# Patient Record
Sex: MALE | Race: BLACK OR AFRICAN AMERICAN
[De-identification: names, ages, dates, MRNs, and addresses within clinical notes are randomized per-mention and may not be internally consistent; named-entity substitution may affect disease eponyms.]

---
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
## 2022-06-13 NOTE — ASU PATIENT PROFILE, PEDIATRIC - TOBACCO USE
Pt is ambulatory to triage-- reports diffuse lower abdominal pain vomting,diarrhea since Nov 2021. Pt also reports 3 episodes of dark red blood in stool, x 3 days. Denies any blood thinners. Never smoker

## 2022-06-28 NOTE — ASU PATIENT PROFILE, PEDIATRIC - PATIENT KNOW
Show Aperture Variable?: Yes Render Post-Care Instructions In Note?: no Detail Level: Detailed Consent: The patient's consent was obtained including but not limited to risks of crusting, scabbing, blistering, scarring, darker or lighter pigmentary change, recurrence, incomplete removal and infection. Duration Of Freeze Thaw-Cycle (Seconds): 10 Post-Care Instructions: I reviewed with the patient in detail post-care instructions. Patient is to wear sunprotection, and avoid picking at any of the treated lesions. Pt may apply Vaseline to crusted or scabbing areas. Number Of Freeze-Thaw Cycles: 2 freeze-thaw cycles yes Application Tool (Optional): Liquid Nitrogen Sprayer Medical Necessity Information: It is in your best interest to select a reason for this procedure from the list below. All of these items fulfill various CMS LCD requirements except the new and changing color options. Medical Necessity Clause: This procedure was medically necessary because the lesions that were treated were: Spray Paint Text: The liquid nitrogen was applied to the skin utilizing a spray paint frosting technique.

## 2023-01-19 NOTE — PHYSICAL THERAPY INITIAL EVALUATION PEDIATRIC - ASSISTIVE DEVICE:SUPINE/SIT, REHAB EVAL
Chronic patient Established Note (Follow up visit)      SRINIVAS Thrasher is a 70 y.o. male with a history of CAD, CHF with EF 10% with AICD, T2DM, atrial fibrillation on eliquis who presents today with chronic low back pain. This pain started in 1971 as a result of and injury in the .  He describes a constant, right sided pain that starts in his low back and radiates down the posterior aspect of his right leg to the bottom of his foot.  This is associated with a numbness, tingling sensation.  The pain occurs when he is standing still for longer than 5 minutes.  The pain does not occur when walking unless he is standing first.  He denies heaviness and weakness.  This pain is described in detail below.     Aggravating factors: Standing in place for 5 minutes     Mitigating factors: Walking, medication, rest     Previously seeing: Dr. Davide Wang ([place])     Interval History (7/2/2020):  The patient returns to clinic today for follow up of back pain. He continues to report low back pain that radiates down the posterior aspect of his right leg to the bottom of his foot. He denies any left leg pain. His pain is worse with prolonged standing. He has had injections in the past without relief. He is currently taking Tramadol with limited relief. He denies any other health changes. His pain today is 10/10.     Interval History 8/6/2020:   Ishmael Thrasher presents to the clinic for a follow-up appointment for bilateral leg pain. Since the last visit, Ishmael Thrasher states the pain has been worsening. Current pain intensity is 10/10.     Interval History 9/28/2020:  The patient returns to clinic today for follow up of low back and leg pain. He is s/p right L4/5 and L5/S1 TF PÉREZ on 9/11/2020. He reports no significant relief of his back and leg pain. He continues to report low back pain that radiates down the posterior aspect of his right leg to under his foot. He denies any left leg pain. His pain is  worse with prolonged standing and walking. He is currently taking Norco with limited benefit. He reports that this decreases his pain but does not last. He denies any other health changes. His pain today is 10/10.     Interval History 11/16/2020:  The patient returns to clinic today for follow up of low back pain. He is s/p right L5/S1 and S1 TF PÉREZ on 10/23/2020. He reports 60% relief for about a week. He continues to report low back pain that radiates into the posterior aspect of his right leg to his foot. He denies any left leg pain. His pain is worse with standing and walking. He also reports difficulty sleeping due to pain. He is frustrated with his continued pain. He continues to take Norco but reports that this does not last. He denies any other health changes. His pain today is 10/10.     Interval History 12/21/2020:  Ishmael Thrasher presents to the clinic for a  1 month follow-up appointment. Since the last visit, Ishmael Thrasher states the pain has been stable. Current pain intensity is 10/10.    Interval History 3/31/2021  Patient complains of bilateral knee pain, usually uses walker for assistance but was unable to bring with them. Patient is s/p right cooled RFA on 2/26/21 with 60% relief of symptoms that has since returned. He reports he was admitted in the hospital for acute decompensated heart failure following receiving the second dose of the COVID-19 Moderna vaccine. He states he was admitted to [facility] twice and then discharged and re-admitted [facility] (discharged on 3/20/21) the pain started on 5 days ago while there and he reported it while admitted. He reports his pain level is 10/10. Its effecting his ability ambulate as he has not been able to use his legs since being in the hospital. He reports the pain has been this level the last 5 days since worsening. He is taking the Hydrocodone 5/325 TID, which is not helping. Also taking 325mg ASA with moderate relief  "- his heart doctor told him not to continue taking ASA at this dose. During hospital admission, patient complained of left elbow and left knee pain that was attributed to the gouty arthritis that has since resolved. Left knee pain now feels like his "usual" knee pain. Patient states he was able to move around on a daily basis with PT while admitted in the hospital. Feel more depressed and less motivated since receiving 2/16/21. Patient also complains of bilateral back pain radiating down his right leg, which is unchanged from his baseline chronic pain.    Interval History 7/27/2021:  The patient returns to clinic today for follow up of low back and knee pain. He continues to report right knee pain. He also reports increased low back and right hip pain. He reports intermittent radiating pain into his right lateral thigh. His pain is worse with standing and walking. He denies any left leg pain. He continues to perform a home exercise routine. He continues to take Norco as needed. He denies any other health changes. His pain today is 10/10.    Interval History 10/27/2021:  The patient returns to clinic today for follow up of low back and knee pain. He is s/p right SI joint injection on 8/13/2021. He reports 40% relief of his right hip and buttock pain. He continues to reports low back pain that radiates into the posterior aspect of his right leg to his calf. He denies any left leg pain. He reports increased right knee pain. He did have a recent fall which triggered this pain. His pain is worse with standing and walking. He continues to take Norco, although this provides limited relief. He would like to go back to Tramadol. He denies any other health changes. His pain today is 10/10.    Interval History 12/2/2021:  Mr Thrasher presents for routine medication follow up. He stats prior R knee genicular RFA did well. He continues to have diffuse pain complaints one which is R lateral hip pain and inability to lay on that " site due to focal pain. He states tramadol has not benefit when compared to Norco and would like to rotate back to Norco 5/325mg BID. Denies new ban of pain or neurological changes.     Interval History 1/10/2022:  Mr Thrasher presents for follow up of lower back pain and radicular pain down posterior leg going to bottom of calf. He is s/p focal R GTB TTP and states approx 2 weeks benefit. He states rotation from Tramadol to Norco 5/325mg with some mild benefit.     Interval History 6/17/2022:  Mr Thrasher presents for delayed FU. Over interval he has been stable. He inadverntely was Rxed Norco 7.5mg q6hrs. He also has been started on medical mariajuana and received Norco 10mg for gout while visiting ER. We discussed all these issues and Pt voiced understanding. States last Dosing of Rock was this a.m. Denies new areas of pain, denies any neurological changes. Denies SE of medications.     Interval History 9/19/2022:  Mr Thrasher presents for follow up of chronic pain. He has had worsening pain due to being out of medication as FU was just past 90 days. He is having newer return of L elbow pain. Denies further neurological changes. Denies worsening of chronic pain to R hip and R knee. Continues to take Norco 5/325mg BID without SE.         Interval History  12/12/2022  Patient continues to have bilateral knee pain R>L. He did get relief from genicular RFA in past for 4 months. Back pain is constant and 6-7/10. Back pain is right sided, sharp in nature and radiates down his leg. Discussed repeat genicular RFA then possible MBB. Patient agreeable to this plan. Will get L spine CT as patient is unable to get MRI 2/2 AICD.    Interval History 1/19/2023:  Mr Thrasher presents for follow up of chronic knee pain and s/p R genicular RFA with significant improvement of symptoms. He is ready to to address left knee pain. He has had genicular block and found to be diagnostic but has always pursued R knee RFA as this was main pain  generator. He continues to take Norco 5/325mg BID with benefit and denies SE of medications. Denies newer areas of pain or neurological changes.       Pain Disability Index Review:  Last 3 PDI Scores 1/19/2023 12/12/2022 9/19/2022   Pain Disability Index (PDI) 40 34 50       Pain Medications:  Norco 5mg  ASA 325mg  Topical lidocaine      Opioid Contract: yes     report:  Reviewed and consistent with medication use as prescribed.    Pain Procedures:   9/11/2020- Right L4/5 and L5/S1 TF PÉREZ  10/23/2020- Right L5/S1 and S1 TF PÉREZ  2/5/2021- Bilateral genicular nerve block  2/26/2021- Right genicular cooled RFA  11/12/2021 Right knee genicular RFA  12/17/2021: Right Greater Trochanteric Bursa Injection under Fluoroscopic Guidance  1/5/2022 Right knee genicular RFA     Physical Therapy/Home Exercise: Set up from PT since discharge from hospital, but has not arranged.    Imaging:   Narrative & Impression       EXAMINATION:  XR LUMBAR SPINE 5 VIEW WITH FLEX AND EXT     CLINICAL HISTORY:  Back pain or radiculopathy, > 6 wks;  Radiculopathy, lumbar region     TECHNIQUE:  AP, lateral, oblique views of the lumbar spine with spot view of the lumbosacral region and lateral views in flexion and extension.     COMPARISON:  None     FINDINGS:  There is no collapse or malalignment in the lumbar spine.  There is no significant change in alignment between flexion and extension.     There is reduced of the intervertebral disc spaces at L4-L5 and L5-S1 with disc vacuum phenomena and anterior osteophyte formation.  There are mild neural foraminal stenosis at these levels.     There is mild bilateral neural foraminal stenosis.     There are extensive vascular calcifications in the abdominal aorta.  The soft tissues are otherwise unremarkable.     Impression:     1. Moderate to severe spondylosis and neural foraminal stenosis at L4-L5 and L5-S1.  2. Straightening of lumbar lordosis  3. No changes in alignment in flexion and extension      Electronically signed by resident: Marko Lamas  Date:                                            07/07/2020  Time:                                           15:05     Electronically signed by: Roly Qiu  Date:                                            07/07/2020  Time:                                           15:21          Narrative & Impression       EXAMINATION:  CT LUMBAR SPINE WITHOUT CONTRAST     CLINICAL HISTORY:  Back pain or radiculopathy, > 6 wks;.     COMPARISON:  None.     FINDINGS:  Decrease height of vertebral bodies L4-L5 predominant at L5.  Degenerative changes in the endplates with decrease height intervertebral space L4-L5.  Vacuum phenomenon at L5-S1 indicating remarkable decrease.  Facet hypertrophy and degenerative changes in facet joints L3-L4, bilaterally.     Prominent aortic calcifications.     Left pleural effusion and atelectasis     Impression:     Prominent degenerative changes in the lumbar spine at L4, L5 and S1     Decrease height of intervertebral space L4-L5 with degenerative process and decrease height of vertebral bodies L4-L5 predominant at L5     Remarkable disc disease at L5-S1     Atherosclerosis     Left pleural effusion and atelectasis.  Please see CT of the chest        Electronically signed by: Roly Qiu  Date:                                            07/08/2020  Time:                                           16:00          Allergies:   Review of patient's allergies indicates:   Allergen Reactions    Enalapril maleate Swelling and Edema     Other reaction(s): Swelling    Vasotec [enalaprilat] Swelling     Neck swelling    Zoloft [sertraline] Other (See Comments)     Patient states it put him to sleep in the hospital he could not talk nor move    Cyclobenzaprine Hallucinations     Other reaction(s): Swelling  Hallucinations according to patient.    Other reaction(s): Lymphadenopathy, Restlessness, Sensation of being cold, Restlessness,  Sensation of being cold    Amitriptyline Hallucinations    Labetalol      Other reaction(s): Pharyngeal swelling    Lisinopril      Other reaction(s): Pharyngeal swelling    Tramadol Nausea Only and Hallucinations       Current Medications:   Current Outpatient Medications   Medication Sig Dispense Refill    allopurinol (ZYLOPRIM) 300 MG tablet Take 300 mg by mouth once daily.      apixaban (ELIQUIS) 5 mg Tab Take 5 mg by mouth 2 (two) times daily.      aspirin (ECOTRIN) 81 MG EC tablet Take 81 mg by mouth.      atorvastatin (LIPITOR) 80 MG tablet Take 80 mg by mouth once daily.      cholecalciferol, vitamin D3, 3,000 unit Tab Take 1 tablet by mouth.      coenzyme Q10 10 mg capsule Take 200 mg by mouth once daily.      colchicine (COLCRYS) 0.6 mg tablet TK 1 T PO BID PRF GOUT      febuxostat (ULORIC) 40 mg Tab Take 1 tablet (40 mg total) by mouth once daily. 30 tablet 6    glipiZIDE (GLUCOTROL) 5 MG tablet Take 5 mg by mouth 2 (two) times daily before meals.      HYDROcodone-acetaminophen (NORCO) 5-325 mg per tablet Take 1 tablet by mouth every 6 (six) hours as needed for Pain. 60 tablet 0    indomethacin (INDOCIN) 50 MG capsule Take 1 capsule (50 mg total) by mouth 3 (three) times daily with meals. 30 capsule 0    melatonin 5 mg Cap Take by mouth.      metoprolol succinate (TOPROL-XL) 50 MG 24 hr tablet Take 1 tablet (50 mg total) by mouth once daily. 30 tablet 1    potassium chloride SA (K-DUR,KLOR-CON) 20 MEQ tablet Take 1 tablet (20 mEq total) by mouth 2 (two) times daily. 180 tablet 3    risperiDONE (RISPERDAL) 3 MG Tab Take by mouth.      sacubitriL-valsartan (ENTRESTO) 49-51 mg per tablet Take 1 tablet by mouth 2 (two) times daily. 60 tablet 3    sertraline (ZOLOFT) 50 MG tablet Take 50 mg by mouth once daily. TAKE THREE TABLETS BY MOUTH EVERY MORNING TO IMPROVE MOOD      sildenafiL (VIAGRA) 100 MG tablet Take 1 tablet (100 mg total) by mouth As instructed for Erectile Dysfunction. Please take 1 tablet one  hour prior to intercourse on empty stomach 30 tablet 1    spironolactone (ALDACTONE) 25 MG tablet Take 1 tablet (25 mg total) by mouth once daily. 90 tablet 3    tadalafiL (CIALIS) 20 MG Tab Take 1 pill as needed prior to sex 20 tablet 1    torsemide (DEMADEX) 20 MG Tab Take 2 tablets (40 mg total) by mouth 2 (two) times daily. 360 tablet 3     No current facility-administered medications for this visit.       REVIEW OF SYSTEMS:    GENERAL:  No weight loss, malaise or fevers.  HEENT:  Negative for frequent or significant headaches.  NECK:  Negative for lumps, goiter, pain and significant neck swelling.  RESPIRATORY:  Negative for cough, wheezing or shortness of breath.  CARDIOVASCULAR:  Negative for chest pain, leg swelling or palpitations. +CHF, CAD, HTN  GI:  Negative for abdominal discomfort, blood in stools or black stools or change in bowel habits. GERD  MUSCULOSKELETAL:  See HPI  SKIN:  Negative for lesions, rash, and itching.  PSYCH:  Negative for sleep disturbance, mood disorder and recent psychosocial stressors.  HEMATOLOGY/LYMPHOLOGY:  Negative for prolonged bleeding, bruising easily or swollen nodes.  NEURO:   Daily migraine headaches  ENDO: Diabetes  All other reviewed and negative other than HPI.    Past Medical History:  Past Medical History:   Diagnosis Date    Brain damage     traumatic    CAD (coronary artery disease)     Cardiomyopathy     CHF (congestive heart failure)     Diabetes mellitus     type 2    Edema     Erectile dysfunction     GERD (gastroesophageal reflux disease)     HTN (hypertension)     Hyperlipemia     Sciatic nerve pain, right     leg       Past Surgical History:  Past Surgical History:   Procedure Laterality Date    CARDIAC DEFIBRILLATOR PLACEMENT      CATHETERIZATION OF BOTH LEFT AND RIGHT HEART Bilateral 08/20/2018    CORONARY ARTERY BYPASS GRAFT      ILIAC ARTERY STENT      INJECTION OF JOINT Right 8/13/2021    Procedure: INJECTION, JOINT, SACROILIAC (SI);  Surgeon: Austin  MD Jaqueline;  Location: [facility] PAIN MGT;  Service: Pain Management;  Laterality: Right;    RADIOFREQUENCY ABLATION Right 2/26/2021    Procedure: RADIOFREQUENCY ABLATION GENICULAR NERVES, COOLED;  Surgeon: Austin Parsons MD;  Location: [facility] PAIN MGT;  Service: Pain Management;  Laterality: Right;  1 of 2  consent needed  eliquis clearance requested    RADIOFREQUENCY ABLATION Right 11/12/2021    Procedure: RADIOFREQUENCY ABLATION, GENICULAR COOLED  NEED CONSENT/ clear to hold  ELIQUIS;  Surgeon: Austin Parsons MD;  Location: [facility] PAIN MGT;  Service: Pain Management;  Laterality: Right;    RADIOFREQUENCY ABLATION Right 1/5/2023    Procedure: RADIOFREQUENCY ABLATION RIGHT GENICULAR NERVES COOLED clear to hold Eliquis 3 days;  Surgeon: Austin Parsons MD;  Location: [facility] PAIN MGT;  Service: Pain Management;  Laterality: Right;    RIGHT HEART CATHETERIZATION Right 8/17/2020    Procedure: INSERTION, CATHETER, RIGHT HEART;  Surgeon: Brianda Navas MD;  Location: [facility] CATH LAB;  Service: Cardiology;  Laterality: Right;    TRANSFORAMINAL EPIDURAL INJECTION OF STEROID Right 9/11/2020    Procedure: INJECTION, STEROID, EPIDURAL, TRANSFORAMINAL APPROACH, L4-L5 AND L5-S1 clear to hold Eliquis 3 days;  Surgeon: Austin Parsons MD;  Location: [facility] PAIN MGT;  Service: Pain Management;  Laterality: Right;    TRANSFORAMINAL EPIDURAL INJECTION OF STEROID Right 10/23/2020    Procedure: INJECTION, STEROID, EPIDURAL, TRANSFORAMINAL APPROACH;  Surgeon: Austin Parsons MD;  Location: [facility] PAIN MGT;  Service: Pain Management;  Laterality: Right;  RT RFA L5/S1 and S1  Eliquis clearance in Media       Family History:  History reviewed. No pertinent family history.    Social History:  Social History     Socioeconomic History    Marital status:    Tobacco Use    Smoking status: Never    Smokeless tobacco: Never   Substance and Sexual Activity    Alcohol use: Not Currently    Drug use: Not Currently       OBJECTIVE:    /84 (BP  "Location: Left arm, Patient Position: Sitting, BP Method: Large (Automatic))   Pulse (!) 51   Resp 18   Ht 6' 4" (1.93 m)   Wt 108.9 kg (240 lb)   BMI 29.21 kg/m²     PHYSICAL EXAMINATION: 2021  Voice normal.  Normal affect without evidence of distress.  Musculoskeletal: Focal TTP to R GTB, + R leg straight leg test, + facet loading R L-spine  Limited ROM of the knee joints R>L.   Gait: antalgic, aided with cane     ASSESSMENT: 73 y.o. year old male with pain, consistent with the followin. Chronic pain of left knee  Procedure Order to Pain Management      2. Dorsalgia, unspecified        3. Primary osteoarthritis of knee, unspecified laterality        4. Lumbar spondylosis              PLAN:   - Prior records reviewed  - S/p Repeat R knee genicular RFA and states significant improvement in pain and functioning   - will s/f left genicular RFA  -UDS 2022 Compliant   - LAPMP without issues  - Dr Parsons is provider of medication mgt and agrees with above plan of care.   -Repeat L spine CT was not done, cannot do MRI 2/2 AICD   -Possible MBB to RFA if appropriate but will address L knee 1st.   -Follow up 4 weeks after RFA.   The above plan and management options were discussed at length with patient. Patient is in agreement with the above and verbalized understanding.    Mike Daley NP  2023     I spent a total of 30 minutes on the day of the visit.  This includes face to face time and non-face to face time preparing to see the patient by reviewing previous labs/imaging, obtaining and/or reviewing separately obtained history, documenting clinical information in the electronic or other health record, independently interpreting results and communicating results to the patient/family/caregiver.        " HOB elevated

## 2024-01-14 ENCOUNTER — EMERGENCY (EMERGENCY)
Facility: HOSPITAL | Age: 24
LOS: 0 days | Discharge: HOME | End: 2024-01-14
Attending: EMERGENCY MEDICINE
Payer: COMMERCIAL

## 2024-01-14 VITALS
WEIGHT: 255.07 LBS | SYSTOLIC BLOOD PRESSURE: 188 MMHG | HEIGHT: 66 IN | HEART RATE: 101 BPM | OXYGEN SATURATION: 99 % | DIASTOLIC BLOOD PRESSURE: 89 MMHG | TEMPERATURE: 99 F | RESPIRATION RATE: 18 BRPM

## 2024-01-14 DIAGNOSIS — M54.89 OTHER DORSALGIA: ICD-10-CM

## 2024-01-14 DIAGNOSIS — F17.200 NICOTINE DEPENDENCE, UNSPECIFIED, UNCOMPLICATED: ICD-10-CM

## 2024-01-14 DIAGNOSIS — Z98.890 OTHER SPECIFIED POSTPROCEDURAL STATES: Chronic | ICD-10-CM

## 2024-01-14 PROCEDURE — 71046 X-RAY EXAM CHEST 2 VIEWS: CPT | Mod: 26

## 2024-01-14 PROCEDURE — 72100 X-RAY EXAM L-S SPINE 2/3 VWS: CPT | Mod: 26

## 2024-01-14 PROCEDURE — 99284 EMERGENCY DEPT VISIT MOD MDM: CPT | Mod: 25

## 2024-01-14 PROCEDURE — 71046 X-RAY EXAM CHEST 2 VIEWS: CPT

## 2024-01-14 PROCEDURE — 96372 THER/PROPH/DIAG INJ SC/IM: CPT

## 2024-01-14 PROCEDURE — 99284 EMERGENCY DEPT VISIT MOD MDM: CPT

## 2024-01-14 PROCEDURE — 72100 X-RAY EXAM L-S SPINE 2/3 VWS: CPT

## 2024-01-14 RX ORDER — METHOCARBAMOL 500 MG/1
1000 TABLET, FILM COATED ORAL ONCE
Refills: 0 | Status: COMPLETED | OUTPATIENT
Start: 2024-01-14 | End: 2024-01-14

## 2024-01-14 RX ORDER — KETOROLAC TROMETHAMINE 30 MG/ML
30 SYRINGE (ML) INJECTION ONCE
Refills: 0 | Status: DISCONTINUED | OUTPATIENT
Start: 2024-01-14 | End: 2024-01-14

## 2024-01-14 RX ADMIN — METHOCARBAMOL 1000 MILLIGRAM(S): 500 TABLET, FILM COATED ORAL at 22:03

## 2024-01-14 RX ADMIN — Medication 30 MILLIGRAM(S): at 22:02

## 2024-01-14 NOTE — ED ADULT NURSE NOTE - OBJECTIVE STATEMENT
Patient reports to ED c/o mid back pain that started yesterday and worsened today. Patient denies any recent trauma to the area.

## 2024-01-14 NOTE — ED PROVIDER NOTE - CARE PLAN
He is ok with seeing rheum. Please call him to schedule appoint. Thank you   1 Principal Discharge DX:	Back pain

## 2024-01-14 NOTE — ED PROVIDER NOTE - PATIENT PORTAL LINK FT
You can access the FollowMyHealth Patient Portal offered by [facility] by registering at the following website: [email or web address]. By joining [employer]’s FollowMyHealth portal, you will also be able to view your health information using other applications (apps) compatible with our system. You can access the FollowMyHealth Patient Portal offered by [facility] by registering at the following website: [email or web address]. By joining [employer]’s FollowMyHealth portal, you will also be able to view your health information using other applications (apps) compatible with our system. You can access the FollowMyHealth Patient Portal offered by [facility] by registering at the following website: [email or web address]. By joining [employer]’s FollowMyHealth portal, you will also be able to view your health information using other applications (apps) compatible with our system.

## 2024-01-14 NOTE — ED PROVIDER NOTE - OBJECTIVE STATEMENT
23 male history of severe scoliosis status post lumbosacral spinal fusion 2017 presenting to ED for evaluation of right-sided back pain radiating to the front.  Patient states that the symptoms been going on since about 5 today and describes them as a sharp sensation in his back.  Concerned about the hardware.  Denies any fever, chills, N, V, CP, SOB, recent trauma to back

## 2024-01-14 NOTE — ED PROVIDER NOTE - PHYSICAL EXAMINATION
VITAL SIGNS: I have reviewed nursing notes and confirm.  CONSTITUTIONAL:  in no acute distress.  SKIN: Skin exam is warm and dry, no acute rash.  HEAD: Normocephalic; atraumatic.  EYES:  EOM intact; conjunctiva and sclera clear.  ENT: No nasal discharge; airway clear  NECK: Supple; non tender.  CARD: S1, S2 normal; no murmurs, gallops, or rubs. Regular rate and rhythm. pain to right back with certain movements of arm   RESP: No wheezes, rales or rhonchi. Speaking in full sentences.   ABD: Normal bowel sounds; soft; non-distended; non-tender; No rebound or guarding. No CVA tenderness. no midline tenderness

## 2024-01-14 NOTE — ED PROVIDER NOTE - WR INTERPRETATION DATE TIME  1
Post-Discharge Transitional Care Management Services or Hospital Follow Up      Augustine Johnson   YOB: 1967    Date of Office Visit:  9/28/2021  Date of Hospital Admission: 9/26/21  Date of Hospital Discharge: 9/26/21  Readmission Risk Score(high >=14%.  Medium >=10%):No data recorded    Care management risk score Rising risk (score 2-5) and Complex Care (Scores >=6): 4     Non face to face  following discharge, date last encounter closed (first attempt may have been earlier): *No documented post hospital discharge outreach found in the last 14 days *No documented post hospital discharge outreach found in the last 14 days    Call initiated 2 business days of discharge: *No response recorded in the last 14 days     Patient Active Problem List   Diagnosis    Hyperlipidemia    Tobacco abuse    Plantar fasciitis    GERD (gastroesophageal reflux disease)    Coronary artery disease involving native coronary artery of native heart without angina pectoris    S/P right coronary artery (RCA) stent placement    Hypothyroidism    Mixed hyperlipidemia    Acute non-recurrent sinusitis    Essential hypertension    Chronic cholecystitis with calculus    Dizziness    Chronic pansinusitis    Hyponatremia    Acute kidney injury ([address]    Diarrhea    BRBPR (bright red blood per rectum)    Chronic heartburn    Family history of colonic polyps       Allergies   Allergen Reactions    Aspirin Swelling     Throat & Tongue swells per patient     Bactrim [Sulfamethoxazole-Trimethoprim] Other (See Comments)     Severe muscle cramps, dizziness    Ciprofloxacin Swelling    Simvastatin      Makes hand hurt and muscles        Adhesive Tape Rash     CAN USE SILK OR CLOTH TAPE    Lipitor [Atorvastatin] Other (See Comments)     Muscles hurt per patient     Tramadol Nausea Only       Medications listed as ordered at the time of discharge from hospital   [place], [address] Medication Instructions PIERCE: Printed on:09/28/21 1144   Medication Information                      albuterol (ACCUNEB) 1.25 MG/3ML nebulizer solution  Inhale 3 mLs into the lungs every 6 hours as needed for Wheezing             amoxicillin-clavulanate (AUGMENTIN) 875-125 MG per tablet  Take 1 tablet by mouth 2 times daily for 10 days             budesonide (PULMICORT) 0.25 MG/2ML nebulizer suspension  INHALE 2MLS VIA NEBULIZER 2 TIMES DAILY             clopidogrel (PLAVIX) 75 MG tablet  TAKE 1 TABLET BY MOUTH EVERY DAY             famotidine (PEPCID) 40 MG tablet  Take 40 mg by mouth 2 times daily             fluticasone (FLONASE) 50 MCG/ACT nasal spray  SPRAY 2 SPRAYS INTO EACH NOSTRIL EVERY DAY             gabapentin (NEURONTIN) 300 MG capsule  Take 300 mg by mouth 3 times daily. HYDROcodone-acetaminophen (NORCO) 5-325 MG per tablet  Take 1 tablet by mouth every 8 hours as needed. ipratropium-albuterol (DUONEB) 0.5-2.5 (3) MG/3ML SOLN nebulizer solution  Inhale 3 mLs into the lungs every 6 hours as needed for Shortness of Breath             levothyroxine (SYNTHROID) 75 MCG tablet  TAKE 1 TABLET BY MOUTH EVERY DAY             lisinopril (PRINIVIL;ZESTRIL) 20 MG tablet  TAKE 1 TABLET BY MOUTH EVERY DAY             metoclopramide (REGLAN) 10 MG tablet  Take 1 tablet by mouth 4 times daily             metoprolol succinate (TOPROL XL) 100 MG extended release tablet  TAKE 1 TABLET BY MOUTH EVERY DAY             montelukast (SINGULAIR) 10 MG tablet  TAKE 1 TABLET BY MOUTH EVERY DAY             nitroGLYCERIN (NITROSTAT) 0.3 MG SL tablet  up to max of 3 total doses. If no relief after 1 dose, call 911.              PARoxetine (PAXIL) 10 MG tablet  Take 1 tablet by mouth daily             promethazine (PHENERGAN) 25 MG tablet  Take 1 tablet by mouth every 8 hours as needed for Nausea             rosuvastatin (CRESTOR) 20 MG tablet  TAKE 1 TABLET BY MOUTH EVERY DAY             tiZANidine (ZANAFLEX) 4 MG tablet  Take 4 mg by mouth 2 times daily as needed             triamcinolone (KENALOG) 0.1 % cream  Apply topically 2 times daily Apply topically 2 times daily. Medications marked \"taking\" at this time  Outpatient Medications Marked as Taking for the 9/28/21 encounter (Office Visit) with GUERLINE Woods   Medication Sig Dispense Refill    levothyroxine (SYNTHROID) 75 MCG tablet TAKE 1 TABLET BY MOUTH EVERY DAY 90 tablet 1    montelukast (SINGULAIR) 10 MG tablet TAKE 1 TABLET BY MOUTH EVERY DAY 90 tablet 1    lisinopril (PRINIVIL;ZESTRIL) 20 MG tablet TAKE 1 TABLET BY MOUTH EVERY DAY 90 tablet 1    PARoxetine (PAXIL) 10 MG tablet Take 1 tablet by mouth daily 30 tablet 3    amoxicillin-clavulanate (AUGMENTIN) 875-125 MG per tablet Take 1 tablet by mouth 2 times daily for 10 days 20 tablet 0    metoclopramide (REGLAN) 10 MG tablet Take 1 tablet by mouth 4 times daily 120 tablet 0    metoprolol succinate (TOPROL XL) 100 MG extended release tablet TAKE 1 TABLET BY MOUTH EVERY DAY 90 tablet 3    clopidogrel (PLAVIX) 75 MG tablet TAKE 1 TABLET BY MOUTH EVERY DAY 14 tablet 0    fluticasone (FLONASE) 50 MCG/ACT nasal spray SPRAY 2 SPRAYS INTO EACH NOSTRIL EVERY DAY 1 Bottle 0    rosuvastatin (CRESTOR) 20 MG tablet TAKE 1 TABLET BY MOUTH EVERY DAY 90 tablet 0    HYDROcodone-acetaminophen (NORCO) 5-325 MG per tablet Take 1 tablet by mouth every 8 hours as needed.  famotidine (PEPCID) 40 MG tablet Take 40 mg by mouth 2 times daily      promethazine (PHENERGAN) 25 MG tablet Take 1 tablet by mouth every 8 hours as needed for Nausea 60 tablet 0    gabapentin (NEURONTIN) 300 MG capsule Take 300 mg by mouth 3 times daily.       tiZANidine (ZANAFLEX) 4 MG tablet Take 4 mg by mouth 2 times daily as needed      budesonide (PULMICORT) 0.25 MG/2ML nebulizer suspension INHALE 2MLS VIA NEBULIZER 2 TIMES DAILY (Patient taking differently: Take 1 ampule by nebulization 2 times daily as needed Rinse mouth out with water (without swallowing) after every dose.) 360 mL 1    albuterol (ACCUNEB) 1.25 MG/3ML nebulizer solution Inhale 3 mLs into the lungs every 6 hours as needed for Wheezing 360 mL 3    triamcinolone (KENALOG) 0.1 % cream Apply topically 2 times daily Apply topically 2 times daily. 80 g 1    nitroGLYCERIN (NITROSTAT) 0.3 MG SL tablet up to max of 3 total doses. If no relief after 1 dose, call 911. 30 tablet 3    ipratropium-albuterol (DUONEB) 0.5-2.5 (3) MG/3ML SOLN nebulizer solution Inhale 3 mLs into the lungs every 6 hours as needed for Shortness of Breath 360 mL 0        Medications patient taking as of now reconciled against medications ordered at time of hospital discharge: Yes    Chief Complaint   Patient presents with    Follow-Up from Hospital     pt stated that on saturday she started throwing up and had blood in her stool.  Labs Only     needing labs for medication refill    Abdominal Pain     pt states her stomach is hurting all over       HPI    Inpatient course: Discharge summary reviewed- see chart. Chichi Calvillo is a 49 y.o. female who presents to the emergency department with complaints of NVD acute onset started last night. She denies any fever or chills tells me 2 years ago she had a similar episode she actually got septic and had to be admitted for IV antibiotics she endorses obvious blood in her stool she is giving us a stool sample at this time she does not have a gallbladder gallstone or pancreatic history she does have a GERD history. She does not have any concerning vitals on triage today for sepsis criteria. The pain is primarily left upper quadrant with no flank referral.  Described as a 3 out of 10 no interventions. Denies any respiratory symptoms no loss of taste or smell no body aches. Patient denies any  symptoms. Patient has an MDRO history tells me she had C. difficile 2 years ago as well denies any recent antibiotics.   Multiple allergies.     Interval history/Current status: Still hurting in abdomen  Was treated with Reglan, and Augmentin for infection colitis   Doesn't feel bad but stools are not watery smells bad. Like mush. It is yellow in color. Gi dr?   Having diarrhea after eating and eating meals causing abdominal distention and pain. Trouble with the 3 fingers on the right hand numbness and at night goes to sleep. Can't feel heat or cold. Or sharp edges. Review of Systems   Constitutional: Positive for appetite change. Negative for fever. HENT: Negative for nosebleeds and trouble swallowing. Respiratory: Positive for cough and shortness of breath. Cardiovascular: Negative for chest pain and leg swelling. Gastrointestinal: Positive for abdominal distention, abdominal pain, diarrhea, nausea and vomiting. Genitourinary: Negative for difficulty urinating and frequency. Musculoskeletal: Positive for back pain. Negative for myalgias. Neurological: Positive for dizziness. Negative for headaches. Psychiatric/Behavioral: Positive for dysphoric mood. Negative for sleep disturbance. Vitals:    09/28/21 1029   BP: 120/70   Site: Left Upper Arm   Position: Sitting   Cuff Size: Medium Adult   Pulse: 64   Temp: 98.1 °F (36.7 °C)   TempSrc: Temporal   SpO2: 98%   Weight: 203 lb (92.1 kg)   Height: 5' 9\" (1.753 m)     Body mass index is 29.98 kg/m². Wt Readings from Last 3 Encounters:   09/28/21 203 lb (92.1 kg)   09/26/21 210 lb (95.3 kg)   07/23/21 210 lb (95.3 kg)     BP Readings from Last 3 Encounters:   09/28/21 120/70   09/26/21 125/72   07/23/21 120/78       Physical Exam  Constitutional:       Appearance: Normal appearance. She is well-developed and well-groomed. HENT:      Head: Normocephalic and atraumatic. Right Ear: Tympanic membrane, ear canal and external ear normal. There is no impacted cerumen. Left Ear: Tympanic membrane, ear canal and external ear normal. There is no impacted cerumen.       Nose: Nose normal. Mouth/Throat:      Lips: Pink. Mouth: Mucous membranes are moist.      Dentition: Normal dentition. Pharynx: Oropharynx is clear. Uvula midline. Eyes:      General: Lids are normal.         Right eye: No discharge. Left eye: No discharge. Extraocular Movements: Extraocular movements intact. Conjunctiva/sclera: Conjunctivae normal.      Right eye: Right conjunctiva is not injected. Left eye: Left conjunctiva is not injected. Pupils: Pupils are equal, round, and reactive to light. Neck:      Thyroid: No thyromegaly. Vascular: No carotid bruit or JVD. Cardiovascular:      Rate and Rhythm: Normal rate and regular rhythm. Pulses: Normal pulses. Carotid pulses are 2+ on the right side and 2+ on the left side. Radial pulses are 2+ on the right side and 2+ on the left side. Heart sounds: Normal heart sounds, S1 normal and S2 normal. No murmur heard. Pulmonary:      Effort: Pulmonary effort is normal.      Breath sounds: Normal breath sounds. Abdominal:      General: Bowel sounds are normal. There is no distension or abdominal bruit. Palpations: Abdomen is soft. There is no mass. Hernia: No hernia is present. Musculoskeletal:         General: Normal range of motion. Cervical back: Full passive range of motion without pain, normal range of motion and neck supple. Right lower leg: No edema. Left lower leg: No edema. Lymphadenopathy:      Cervical: No cervical adenopathy. Right cervical: No superficial cervical adenopathy. Left cervical: No superficial cervical adenopathy. Upper Body:      Right upper body: No supraclavicular adenopathy. Left upper body: No supraclavicular adenopathy. Skin:     General: Skin is warm and dry. Coloration: Skin is not pale. Findings: No lesion or rash. Nails: There is no clubbing.    Neurological:      Mental Status: She is alert and oriented to person, place, and time. Motor: No weakness or tremor. Coordination: Coordination normal.      Deep Tendon Reflexes: Reflexes are normal and symmetric. Psychiatric:         Attention and Perception: Attention normal.         Mood and Affect: Mood normal.         Speech: Speech normal.         Behavior: Behavior normal. Behavior is cooperative. Thought Content: Thought content normal.         Cognition and Memory: Cognition and memory normal.         Judgment: Judgment normal.             Assessment/Plan:   Diagnosis Orders   1. Infectious colitis  GUERLINE Fritz, G[facility], [place]    MS DISCHARGE MEDS RECONCILED W/ CURRENT OUTPATIENT MED LIST   2. Hypothyroidism, unspecified type  levothyroxine (SYNTHROID) 75 MCG tablet    TSH WITH REFLEX TO FT4   3. Coronary artery disease involving native coronary artery of native heart without angina pectoris  Lipid, Fasting   4. Bronchitis  montelukast (SINGULAIR) 10 MG tablet   5. Essential hypertension  lisinopril (PRINIVIL;ZESTRIL) 20 MG tablet   6. Anxiety  PARoxetine (PAXIL) 10 MG tablet   7.  Numbness and tingling in right hand  Nerve conduction test    EMG     History return in 2 to 4 weeks for evaluation of the Paxil    Medical Decision Making: high complexity 15-Jose Eduardo-2024 04:23

## 2024-01-14 NOTE — ED ADULT NURSE NOTE - NSFALLUNIVINTERV_ED_ALL_ED
Bed/Stretcher in lowest position, wheels locked, appropriate side rails in place/Call bell, personal items and telephone in reach/Instruct patient to call for assistance before getting out of bed/chair/stretcher/Non-slip footwear applied when patient is off stretcher/[place] to call system/Physically safe environment - no spills, clutter or unnecessary equipment/Purposeful proactive rounding/Room/bathroom lighting operational, light cord in reach Bed/Stretcher in lowest position, wheels locked, appropriate side rails in place/Call bell, personal items and telephone in reach/Instruct patient to call for assistance before getting out of bed/chair/stretcher/Non-slip footwear applied when patient is off stretcher/[place] to call system/Physically safe environment - no spills, clutter or unnecessary equipment/Purposeful proactive rounding/Room/bathroom lighting operational, light cord in reach Bed/Stretcher in lowest position, wheels locked, appropriate side rails in place/Call bell, personal items and telephone in reach/Instruct patient to call for assistance before getting out of bed/chair/stretcher/Non-slip footwear applied when patient is off stretcher/[place] to call system/Physically safe environment - no spills, clutter or unnecessary equipment/Purposeful proactive rounding/Room/bathroom lighting operational, light cord in reach

## 2024-01-14 NOTE — ED PROVIDER NOTE - CLINICAL SUMMARY MEDICAL DECISION MAKING FREE TEXT BOX
23-year-old male with back pain most likely MSK in etiology.  No known risk factors for PE.  Patient appears well, pain is positional.  X-rays were independently read by me, ED attending Dr Adry Hernandez as negative for acute pathology.  Patient was given dose of analgesia, reported feeling better, stable for discharge home, advised to follow-up with his PCP this week, strict return precautions given.  Prior records were reviewed, collateral information obtained from the patient's father.

## 2024-01-14 NOTE — ED PROVIDER NOTE - ATTENDING APP SHARED VISIT CONTRIBUTION OF CARE
23-year-old male past medical history of obesity, adolescent idiopathic scoliosis s/p T10-L5 spinal fusion 5 years ago presenting for evaluation of nontraumatic right-sided upper back pain since yesterday.  Pain is worse with movement.  Patient states that yesterday he was moving clothing out of his house to be donated.  Did not take anything for pain since yesterday.  Denies any associated symptoms such as fever, chills, shortness of breath, cough, abdominal pain, nausea vomiting or any other additional complaints.  Well-appearing male in no acute distress, lungs clear to auscultation bilaterally, no tenderness to the chest wall, no skin rash, RRR, well-perfused extremities, patient seen wincing when changing position.  Impression/plan: Most likely MSK pain, will give analgesia, obtain x-ray, anticipate discharge home with outpatient PCP follow-up.  Patient and his father are amenable with the plan.

## 2024-01-14 NOTE — ED PROVIDER NOTE - NSFOLLOWUPINSTRUCTIONS_ED_ALL_ED_FT
Please follow up with your primary care doctor in 1-3 days     Back Pain    WHAT YOU NEED TO KNOW:    Back pain is common. It can be caused by many conditions, such as arthritis or the breakdown of spinal discs. Your risk for back pain is increased by injuries, lack of activity, or repeated bending and twisting. You may feel sore or stiff on one or both sides of your back. The pain may spread to your buttocks or thighs.    DISCHARGE INSTRUCTIONS:    Return to the emergency department if:     You have pain, numbness, or weakness in one or both legs.      Your pain becomes so severe that you cannot walk.      You cannot control your urine or bowel movements.      You have severe back pain with chest pain.      You have severe back pain, nausea, and vomiting.      You have severe back pain that spreads to your side or genital area.    Contact your healthcare provider if:     You have back pain that does not get better with rest and pain medicine.      You have a fever.      You have pain that worsens when you are on your back or when you rest.      You have pain that worsens when you cough or sneeze.      You lose weight without trying.      You have questions or concerns about your condition or care.    Medicines:     NSAIDs help decrease swelling and pain. This medicine is available with or without a doctor's order. NSAIDs can cause stomach bleeding or kidney problems in certain people. If you take blood thinner medicine, always ask your healthcare provider if NSAIDs are safe for you. Always read the medicine label and follow directions.      Acetaminophen decreases pain and fever. It is available without a doctor's order. Ask how much to take and how often to take it. Follow directions. Read the labels of all other medicines you are using to see if they also contain acetaminophen, or ask your doctor or pharmacist. Acetaminophen can cause liver damage if not taken correctly. Do not use more than 4 grams (4,000 milligrams) total of acetaminophen in one day.       Muscle relaxers help decrease muscle spasms and back pain.      Prescription pain medicine may be given. Ask your healthcare provider how to take this medicine safely. Some prescription pain medicines contain acetaminophen. Do not take other medicines that contain acetaminophen without talking to your healthcare provider. Too much acetaminophen may cause liver damage. Prescription pain medicine may cause constipation. Ask your healthcare provider how to prevent or treat constipation.       Take your medicine as directed. Contact your healthcare provider if you think your medicine is not helping or if you have side effects. Tell him or her if you are allergic to any medicine. Keep a list of the medicines, vitamins, and herbs you take. Include the amounts, and when and why you take them. Bring the list or the pill bottles to follow-up visits. Carry your medicine list with you in case of an emergency.    How to manage your back pain:     Apply ice on your back for 15 to 20 minutes every hour or as directed. Use an ice pack, or put crushed ice in a plastic bag. Cover it with a towel before you apply it to your skin. Ice helps prevent tissue damage and decreases pain.      Apply heat on your back for 20 to 30 minutes every 2 hours for as many days as directed. Heat helps decrease pain and muscle spasms.      Stay active as much as you can without causing more pain. Bed rest could make your back pain worse. Avoid heavy lifting until your pain is gone.      Go to physical therapy as directed. A physical therapist can teach you exercises to help improve movement and strength, and to decrease pain.    Follow up with your healthcare provider in 2 weeks, or as directed: Write down your questions so you remember to ask them during your visits.       © Copyright [employer] 2019 All illustrations and images included in CareNotes are the copyrighted property of [email or web address] or [employer]. Please follow up with your primary care doctor in 1-3 days     Back Pain    WHAT YOU NEED TO KNOW:    Back pain is common. It can be caused by many conditions, such as arthritis or the breakdown of spinal discs. Your risk for back pain is increased by injuries, lack of activity, or repeated bending and twisting. You may feel sore or stiff on one or both sides of your back. The pain may spread to your buttocks or thighs.    DISCHARGE INSTRUCTIONS:    Return to the emergency department if:     You have pain, numbness, or weakness in one or both legs.      Your pain becomes so severe that you cannot walk.      You cannot control your urine or bowel movements.      You have severe back pain with chest pain.      You have severe back pain, nausea, and vomiting.      You have severe back pain that spreads to your side or genital area.    Contact your healthcare provider if:     You have back pain that does not get better with rest and pain medicine.      You have a fever.      You have pain that worsens when you are on your back or when you rest.      You have pain that worsens when you cough or sneeze.      You lose weight without trying.      You have questions or concerns about your condition or care.    Medicines:     NSAIDs help decrease swelling and pain. This medicine is available with or without a doctor's order. NSAIDs can cause stomach bleeding or kidney problems in certain people. If you take blood thinner medicine, always ask your healthcare provider if NSAIDs are safe for you. Always read the medicine label and follow directions.      Acetaminophen decreases pain and fever. It is available without a doctor's order. Ask how much to take and how often to take it. Follow directions. Read the labels of all other medicines you are using to see if they also contain acetaminophen, or ask your doctor or pharmacist. Acetaminophen can cause liver damage if not taken correctly. Do not use more than 4 grams (4,000 milligrams) total of acetaminophen in one day.       Muscle relaxers help decrease muscle spasms and back pain.      Prescription pain medicine may be given. Ask your healthcare provider how to take this medicine safely. Some prescription pain medicines contain acetaminophen. Do not take other medicines that contain acetaminophen without talking to your healthcare provider. Too much acetaminophen may cause liver damage. Prescription pain medicine may cause constipation. Ask your healthcare provider how to prevent or treat constipation.       Take your medicine as directed. Contact your healthcare provider if you think your medicine is not helping or if you have side effects. Tell him or her if you are allergic to any medicine. Keep a list of the medicines, vitamins, and herbs you take. Include the amounts, and when and why you take them. Bring the list or the pill bottles to follow-up visits. Carry your medicine list with you in case of an emergency.    How to manage your back pain:     Apply ice on your back for 15 to 20 minutes every hour or as directed. Use an ice pack, or put crushed ice in a plastic bag. Cover it with a towel before you apply it to your skin. Ice helps prevent tissue damage and decreases pain.      Apply heat on your back for 20 to 30 minutes every 2 hours for as many days as directed. Heat helps decrease pain and muscle spasms.      Stay active as much as you can without causing more pain. Bed rest could make your back pain worse. Avoid heavy lifting until your pain is gone.      Go to physical therapy as directed. A physical therapist can teach you exercises to help improve movement and strength, and to decrease pain.    Follow up with your healthcare provider in 2 weeks, or as directed: Write down your questions so you remember to ask them during your visits.       © Copyright [email or web address] 2019 All illustrations and images included in CareNotes are the copyrighted property of [employer] or [employer]. Please follow up with your primary care doctor in 1-3 days     Back Pain    WHAT YOU NEED TO KNOW:    Back pain is common. It can be caused by many conditions, such as arthritis or the breakdown of spinal discs. Your risk for back pain is increased by injuries, lack of activity, or repeated bending and twisting. You may feel sore or stiff on one or both sides of your back. The pain may spread to your buttocks or thighs.    DISCHARGE INSTRUCTIONS:    Return to the emergency department if:     You have pain, numbness, or weakness in one or both legs.      Your pain becomes so severe that you cannot walk.      You cannot control your urine or bowel movements.      You have severe back pain with chest pain.      You have severe back pain, nausea, and vomiting.      You have severe back pain that spreads to your side or genital area.    Contact your healthcare provider if:     You have back pain that does not get better with rest and pain medicine.      You have a fever.      You have pain that worsens when you are on your back or when you rest.      You have pain that worsens when you cough or sneeze.      You lose weight without trying.      You have questions or concerns about your condition or care.    Medicines:     NSAIDs help decrease swelling and pain. This medicine is available with or without a doctor's order. NSAIDs can cause stomach bleeding or kidney problems in certain people. If you take blood thinner medicine, always ask your healthcare provider if NSAIDs are safe for you. Always read the medicine label and follow directions.      Acetaminophen decreases pain and fever. It is available without a doctor's order. Ask how much to take and how often to take it. Follow directions. Read the labels of all other medicines you are using to see if they also contain acetaminophen, or ask your doctor or pharmacist. Acetaminophen can cause liver damage if not taken correctly. Do not use more than 4 grams (4,000 milligrams) total of acetaminophen in one day.       Muscle relaxers help decrease muscle spasms and back pain.      Prescription pain medicine may be given. Ask your healthcare provider how to take this medicine safely. Some prescription pain medicines contain acetaminophen. Do not take other medicines that contain acetaminophen without talking to your healthcare provider. Too much acetaminophen may cause liver damage. Prescription pain medicine may cause constipation. Ask your healthcare provider how to prevent or treat constipation.       Take your medicine as directed. Contact your healthcare provider if you think your medicine is not helping or if you have side effects. Tell him or her if you are allergic to any medicine. Keep a list of the medicines, vitamins, and herbs you take. Include the amounts, and when and why you take them. Bring the list or the pill bottles to follow-up visits. Carry your medicine list with you in case of an emergency.    How to manage your back pain:     Apply ice on your back for 15 to 20 minutes every hour or as directed. Use an ice pack, or put crushed ice in a plastic bag. Cover it with a towel before you apply it to your skin. Ice helps prevent tissue damage and decreases pain.      Apply heat on your back for 20 to 30 minutes every 2 hours for as many days as directed. Heat helps decrease pain and muscle spasms.      Stay active as much as you can without causing more pain. Bed rest could make your back pain worse. Avoid heavy lifting until your pain is gone.      Go to physical therapy as directed. A physical therapist can teach you exercises to help improve movement and strength, and to decrease pain.    Follow up with your healthcare provider in 2 weeks, or as directed: Write down your questions so you remember to ask them during your visits.       © Copyright [employer] 2019 All illustrations and images included in CareNotes are the copyrighted property of [employer] or [employer].

## 2024-11-10 NOTE — DISCHARGE NOTE PEDIATRIC - HOSPITAL COURSE
[facility] - Emergency Dept  [facility]  History & Physical    Patient Name: Adriana Strong  MRN: [id number]  Patient Class: IP- Inpatient  Admission Date: 11/10/2024  Attending Physician: Alena Damon MD   Primary Care Provider: Krzysztof Mcgraw MD         Patient information was obtained from patient, spouse/SO, past medical records, and ER records.     Subjective:     Principal Problem:Acute on chronic diastolic congestive heart failure    Chief Complaint:   Chief Complaint   Patient presents with    Fatigue     Arrives via ems from home.  Hx CHF and recently put lasixs.  Not following diet outpatient recent stay here.  Endorses generalized weakness.         HPI: Adriana Strong is a 82 y.o.F with hx of diastolic HF, Afib on elqiuis, s/p pacemaker, HLD, HTN, non obstructive CAD, s/p TAVR, asthma, COPD, on home oxygen (2L via NC) who presents to [facility] ED for complaints of worsening shortness of breath, weight gain. Patient reports SOB at rest and with exertion. States she saw cardiology last week where labs were performed. She was notified that her sodium was low and MD requested that patient hold her lasix x3d, continue spironolactone and after the 3d start lasix 40mg daily and additional prn. Otherwise, patient reports compliance with medications, fluid restriction, Na restriction. Also endorsing dysuria for the last couple of days as well as decreased oral intake. Denies fever, chills, chest pain, palpitations, abdominal pain, n/v/d, headaches, or any other symptoms at this time.     In ED: AF, hypertensive, tachypneic on 2-4L via NC. CBC with chronic, stable anemia. Na 128, K 5.6, otherwise CMP unremarkable. Mag 1.5. . Trop negative. TSH 2.559. POC lactate 1.96. UA with 3+ protein, 1+ leukocyte esterase, 17 WBCs, many bacteria, 23 squams. Culture pending. POC US in ED with trace pericardial effusion. CXR with left pleural effusion and congestive change/edema, developing left lower lung zone  consolidation not excluded in this hypoventilatory exam. S/p lasix 80mg inj x2 in ED. Admitted to  for further evaluation    Past Medical History:   Diagnosis Date    Acute on chronic diastolic (congestive) heart failure 11/20/2019    Anticoagulant long-term use     on Plavix since May 2015    Anxiety     Arthritis     Asthma     Atrial fibrillation     Atrial fibrillation with RVR 10/19/2020    Cataracts, bilateral     Chest pain, atypical     Chronic atrial fibrillation with rapid ventricular response 06/23/2017    Colon polyp     Coronary artery disease     Diabetes mellitus     Dry eyes     Esophageal erosions     General anesthetics causing adverse effect in therapeutic use     GERD (gastroesophageal reflux disease)     Heart murmur     Hemorrhoid     High cholesterol     Hypertension     Irritable bowel syndrome     Paroxysmal atrial fibrillation 10/30/2020    Persistent atrial fibrillation 02/10/2020    Shingles 2015    Stenosis of aortic and mitral valves     Stroke     TIA (transient ischemic attack)     Use of cane as ambulatory aid        Past Surgical History:   Procedure Laterality Date    ABDOMINAL SURGERY      stents to SMA    angiocele      2007, 2014    AORTIC VALVE REPLACEMENT N/A 11/19/2019    Procedure: Replacement-valve-aortic;  Surgeon: Jack Capone MD;  Location: [facility] CATH LAB;  Service: Cardiology;  Laterality: N/A;    BREAST SURGERY      left--- a lump--- no cancer    CARDIAC VALVE SURGERY      COLONOSCOPY  2014    COLONOSCOPY N/A 3/14/2018    Procedure: COLONOSCOPY;  Surgeon: Efren Kemp MD;  Location: [facility] ENDO ([address] [place] [address]);  Service: Endoscopy;  Laterality: N/A;  ok to hold Plavix 5 days prior to procedure per Dr RESHMA Hernandez     ok per Dr Kemp to hold Eliquis 2 days prior to procedure (see telephone encounter dated-2/7/18)    CORONARY ANGIOGRAPHY N/A 2/15/2024    Procedure: ANGIOGRAM, CORONARY ARTERY;  Surgeon: Jos Baptiste MD;  Location: [facility] CATH LAB;  Service: Cardiology;   Laterality: N/A;    HERNIA REPAIR      HYSTERECTOMY  partial    1982 partial hysterectomy    IMPLANTATION OF BIVENTRICULAR PERMANENT PACEMAKER AS UPGRADE TO EXISTING PACEMAKER Left 5/29/2024    Procedure: Biventricular pacemaker upgrade;  Surgeon: Chencho Moeller MD;  Location: [facility] EP LAB;  Service: Cardiology;  Laterality: Left;  CHF, AF,  CRTP ugrd, BIO, MAC, IA, 3prep ** BIO dcPPM in situ/ Contrast Prep**    LEFT HEART CATHETERIZATION Right 11/5/2019    Procedure: Left heart cath;  Surgeon: Jack Capone MD;  Location: [facility] CATH LAB;  Service: Cardiology;  Laterality: Right;    left nasal polyp      left neck lymph node      nose polyp      PHLEBOGRAPHY Left 5/1/2024    Procedure: Venogram;  Surgeon: Chencho Moeller MD;  Location: [facility] EP LAB;  Service: Cardiology;  Laterality: Left;  HF, Venogram ( Lt arm) , IA, 3 prep ** BIO dcPPM in situ/ Contrast Prep**    right hip fatty mass tissue      stent to small intestine      SUPERIOR VENA CAVA ANGIOPLASTY / STENTING      TONSILLECTOMY      TOTAL ABDOMINAL HYSTERECTOMY      2014    TOTAL KNEE ARTHROPLASTY Bilateral     TREATMENT OF CARDIAC ARRHYTHMIA N/A 2/10/2020    Procedure: CARDIOVERSION;  Surgeon: Jayy Parrish MD;  Location: [facility] EP LAB;  Service: Cardiology;  Laterality: N/A;  AF, PEDRO, DCCV, MAC, DM, 3 Prep    TREATMENT OF CARDIAC ARRHYTHMIA N/A 5/15/2020    Procedure: CARDIOVERSION;  Surgeon: Hany Bee MD;  Location: [facility] EP LAB;  Service: Cardiology;  Laterality: N/A;  AF, PEDRO, DCCV, MAC, DM, 3 Prep    UPPER GASTROINTESTINAL ENDOSCOPY  2014       Review of patient's allergies indicates:   Allergen Reactions    Celebrex [celecoxib] Shortness Of Breath    Ciprofloxacin Swelling     lip    Dicyclomine Hives    Adhesive Dermatitis    Avelox [moxifloxacin] Swelling    Cilostazol Other (See Comments)     Elevates blood pressure    Eryc [erythromycin] Other (See Comments)     unknown    Iodine and iodide containing products Hives    Keflex  [cephalexin] Hives    Meclomen      rashes    Meloxicam      Ear ringing    Metoclopramide Other (See Comments)     High blood pressure    Sulfa (sulfonamide antibiotics) Itching       Current Facility-Administered Medications on File Prior to Encounter   Medication    0.9%  NaCl infusion    sodium chloride 0.9% flush 5 mL    vancomycin (VANCOCIN) 1,000 mg in dextrose 5 % (D5W) 250 mL IVPB (Vial-Mate)     Current Outpatient Medications on File Prior to Encounter   Medication Sig    albuterol-ipratropium (DUO-NEB) 2.5 mg-0.5 mg/3 mL nebulizer solution Take 3 mLs by nebulization every 4 (four) hours as needed for Wheezing or Shortness of Breath.    apixaban (ELIQUIS) 5 mg Tab Take 1 tablet (5 mg total) by mouth 2 (two) times daily.    ascorbic acid, vitamin C, (VITAMIN C) 500 MG tablet Take 1,000 mg by mouth once daily.     atorvastatin (LIPITOR) 10 MG tablet Take 1 tablet (10 mg total) by mouth once daily.    calcium carbonate/vitamin D3 (CALTRATE 600 + D ORAL) Take 1 tablet by mouth once daily.    carvediloL (COREG) 6.25 MG tablet Take 1 tablet (6.25 mg total) by mouth 2 (two) times daily with meals.    clopidogreL (PLAVIX) 75 mg tablet Take 1 tablet (75 mg total) by mouth once daily.    DULoxetine (CYMBALTA) 20 MG capsule Take 1 capsule (20 mg total) by mouth once daily.    estradioL (ESTRACE) 0.01 % (0.1 mg/gram) vaginal cream Place vaginally.    fexofenadine (ALLEGRA) 60 MG tablet Take 60 mg by mouth once daily.    furosemide (LASIX) 40 MG tablet Take 1 tablet (40 mg total) by mouth 2 (two) times a day.    gabapentin (NEURONTIN) 300 MG capsule Take 1 capsule (300 mg total) by mouth every evening.    isosorbide mononitrate (IMDUR) 60 MG 24 hr tablet Take 1 tablet (60 mg total) by mouth once daily.    LORazepam (ATIVAN) 0.5 MG tablet Take 1 tablet (0.5 mg total) by mouth every morning.    metFORMIN (GLUCOPHAGE) 500 MG tablet Take 1 tablet (500 mg total) by mouth 2 (two) times daily.    montelukast (SINGULAIR) 10 mg  tablet Take 10 mg by mouth once daily.    pantoprazole (PROTONIX) 40 MG tablet Take 1 tablet (40 mg total) by mouth once daily.    ranolazine (RANEXA) 1,000 mg Tb12 TAKE 1 TABLET BY MOUTH TWICE A DAY    spironolactone (ALDACTONE) 25 MG tablet Take 1 tablet (25 mg total) by mouth once daily.     Family History       Problem Relation (Age of Onset)    Heart attack Mother    Heart failure Brother    Stroke Father          Tobacco Use    Smoking status: Never    Smokeless tobacco: Never   Substance and Sexual Activity    Alcohol use: No    Drug use: Never    Sexual activity: Not Currently     Partners: Male     Review of Systems   Constitutional:  Positive for fatigue. Negative for activity change, chills and fever.   HENT:  Negative for trouble swallowing.    Eyes:  Negative for photophobia and visual disturbance.   Respiratory:  Positive for shortness of breath. Negative for cough and chest tightness.    Cardiovascular:  Positive for leg swelling. Negative for chest pain and palpitations.   Gastrointestinal:  Negative for abdominal pain, constipation, diarrhea, nausea and vomiting.   Genitourinary:  Positive for dysuria and frequency. Negative for hematuria.   Musculoskeletal:  Negative for back pain, gait problem and neck pain.   Skin:  Negative for rash and wound.   Neurological:  Negative for dizziness, syncope, speech difficulty and light-headedness.   Psychiatric/Behavioral:  Negative for agitation and confusion. The patient is not nervous/anxious.      Objective:     Vital Signs (Most Recent):  Temp: 97.6 °F (36.4 °C) (11/10/24 1133)  Pulse: 72 (11/10/24 1206)  Resp: (!) 32 (11/10/24 1206)  BP: (!) 170/74 (11/10/24 1206)  SpO2: 95 % (11/10/24 1206) Vital Signs (24h Range):  Temp:  [97.6 °F (36.4 °C)] 97.6 °F (36.4 °C)  Pulse:  [66-72] 72  Resp:  [22-32] 32  SpO2:  [95 %-97 %] 95 %  BP: (158-170)/(74-85) 170/74     Weight: 90.7 kg (200 lb)  Body mass index is 34.33 kg/m².     Physical Exam  Vitals and nursing  note reviewed.   Constitutional:       General: She is not in acute distress.     Appearance: She is well-developed. She is ill-appearing.      Comments: Visibly SOB while speaking   HENT:      Head: Normocephalic and atraumatic.      Mouth/Throat:      Pharynx: No oropharyngeal exudate.   Eyes:      Extraocular Movements: Extraocular movements intact.      Conjunctiva/sclera: Conjunctivae normal.   Neck:      Vascular: JVD present.   Cardiovascular:      Rate and Rhythm: Normal rate and regular rhythm.      Heart sounds: Normal heart sounds.   Pulmonary:      Effort: Pulmonary effort is normal. No respiratory distress.      Breath sounds: Rales present.      Comments: On 4L NC  Abdominal:      General: Bowel sounds are normal. There is no distension.      Palpations: Abdomen is soft.      Tenderness: There is no abdominal tenderness.   Genitourinary:     Comments: Dark prince colored urine noted at bedside  Musculoskeletal:         General: No tenderness. Normal range of motion.      Cervical back: Normal range of motion and neck supple.      Right lower leg: Pitting Edema present.      Left lower leg: Pitting Edema present.   Lymphadenopathy:      Cervical: No cervical adenopathy.   Skin:     General: Skin is warm and dry.      Capillary Refill: Capillary refill takes less than 2 seconds.      Findings: No rash.   Neurological:      Mental Status: She is alert and oriented to person, place, and time.      Cranial Nerves: No cranial nerve deficit.      Sensory: No sensory deficit.      Coordination: Coordination normal.   Psychiatric:         Behavior: Behavior normal.         Thought Content: Thought content normal.         Judgment: Judgment normal.                Significant Labs: All pertinent labs within the past 24 hours have been reviewed.  A1C:   Recent Labs   Lab 09/25/24  0312   HGBA1C 4.9     Bilirubin:   Recent Labs   Lab 11/10/24  1200   BILITOT 0.6     BMP:   Recent Labs   Lab 11/10/24  1200   *    *   K 5.6*   CL 86*   CO2 28   BUN 21   CREATININE 0.8   CALCIUM 9.7   MG 1.5*     CBC:   Recent Labs   Lab 11/10/24  1200   WBC 10.86   HGB 9.8*   HCT 31.8*        CMP:   Recent Labs   Lab 11/10/24  1200   *   K 5.6*   CL 86*   CO2 28   *   BUN 21   CREATININE 0.8   CALCIUM 9.7   PROT 7.6   ALBUMIN 3.2*   BILITOT 0.6   ALKPHOS 45   AST 28   ALT 15   ANIONGAP 14     Cardiac Markers:   Recent Labs   Lab 11/10/24  1200   *     Magnesium:   Recent Labs   Lab 11/10/24  1200   MG 1.5*     Troponin:   Recent Labs   Lab 11/10/24  1200   TROPONINI 0.010     TSH:   Recent Labs   Lab 11/10/24  1200   TSH 2.559     Urine Studies:   Recent Labs   Lab 11/10/24  1214   COLORU Yellow   APPEARANCEUA Hazy*   PHUR 7.0   SPECGRAV 1.020   PROTEINUA 3+*   GLUCUA Negative   KETONESU Negative   BILIRUBINUA Negative   OCCULTUA Negative   NITRITE Negative   LEUKOCYTESUR 1+*   RBCUA 3   WBCUA 17*   BACTERIA Many*   SQUAMEPITHEL 23   HYALINECASTS 9*       Significant Imaging: I have reviewed all pertinent imaging results/findings within the past 24 hours.  Imaging Results              X-Ray Chest AP Portable (Final result)  Result time 11/10/24 12:36:02      Final result by Tesfaye Murray MD (11/10/24 12:36:02)                   Impression:      1. Pulmonary findings suggest left pleural effusion and congestive change/edema.  Developing left lower lung zone consolidation not excluded in this hypoventilatory exam.      Electronically signed by: Tesfaye Murray MD  Date:    11/10/2024  Time:    12:36               Narrative:    EXAMINATION:  XR CHEST AP PORTABLE    CLINICAL HISTORY:  CHF;    TECHNIQUE:  Single frontal view of the chest was performed.    COMPARISON:  10/04/2024    FINDINGS:  The cardiomediastinal silhouette is prominent, similar to the previous exam noting calcification of the aorta and surgical change..  There is obscuration of the left costophrenic angle suggesting effusion.  Left chest  wall pacer noted..  The trachea is midline.  The lungs are symmetrically expanded bilaterally with coarse central hilar interstitial attenuation, suggesting congestive change or edema..  Developing left lower lung zone consolidation not excluded.  There is no pneumothorax.  The osseous structures are remarkable for degenerative change..                                      Assessment/Plan:     * Acute on chronic diastolic congestive heart failure  Patient has Diastolic (HFpEF) heart failure that is Acute on chronic. On presentation their CHF was decompensated. Evidence of decompensated CHF on presentation includes: edema, elevated JVD, crackles on lung auscultation, weight gain, orthopnea, dyspnea on exertion (WHITLEY), and shortness of breath. The etiology of their decompensation is likely medication adjustments . Patient is on the HF pathway. Most recent BNP and echo results are listed below.  Recent Labs     11/10/24  1200   *     Latest ECHO  Results for orders placed during the hospital encounter of 09/24/24    Echo    Interpretation Summary    Left Ventricle: The left ventricle is normal in size. Normal wall thickness. There is concentric remodeling. There is normal systolic function with a visually estimated ejection fraction of 60 - 65%. Grade III diastolic dysfunction. Elevated left ventricular filling pressure.    Right Ventricle: Normal right ventricular cavity size. Wall thickness is normal. Systolic function is normal. Pacemaker lead present in the ventricle.    The left atrium is severely dilated.    Aortic Valve: There is a transcatheter valve replacement in the aortic position. It is reported to be a 26mm Phan Suzi S3 valve. Aortic valve area by VTI is 1.90 cm2. Aortic valve peak velocity is 1.64 m/s. Mean gradient is 6 mmHg. The dimensionless index is 0.57. There is mild aortic regurgitation.    Tricuspid Valve: There is mild regurgitation.    Pulmonary Artery: The estimated pulmonary artery  systolic pressure is 48 mmHg.    IVC/SVC: Elevated venous pressure at 15 mmHg.    Pericardium: There is a small posterior effusion. No indication of cardiac tamponade.    Current Heart Failure Medications  carvediloL tablet 6.25 mg, 2 times daily with meals, Oral  spironolactone tablet 25 mg, Daily, Oral  furosemide injection 80 mg, 2 times daily, Intravenous    Plan  - Will hold on repeat echo for now as patient just had one done 2mo ago  - Place on telemetry  - Place on fluid restriction of 1.5 L.   - Cardiology has not been consulted  - The patient's volume status is stable but not at their baseline as indicated by edema, elevated JVD, crackles on lung auscultation, weight gain, dyspnea on exertion (WHITLEY), and shortness of breath  - s/p 80mg IV lasix x2 in ED  - will continue to diurese with 80mg IV lasix BID for now  - can consider initiating entresto given HF and hypertension  - patient requiring increased O2 from home regimen, VBG pending  - strict intake and output, daily standing weights    Intake/Output Summary (Last 24 hours) at 11/10/2024 1442  Last data filed at 11/10/2024 1356  Gross per 24 hour   Intake --   Output 400 ml   Net -400 ml           Hypomagnesemia  Patient has Abnormal Magnesium: hypomagnesemia. Will continue to monitor electrolytes closely. Will replace the affected electrolytes and repeat labs to be done after interventions completed. The patient's magnesium results have been reviewed and are listed below.  Recent Labs   Lab 11/10/24  1200   MG 1.5*        Hyperkalemia  The patients most recent potassium results are listed below.  Recent Labs     11/08/24  1015 11/10/24  1200   K 4.7 5.6*     Plan  - Monitor for arrhythmias with EKG and/or continuous telemetry.   - Treat the hyperkalemia with Potassium Binders.   - Monitor potassium: Daily  - The patient's hyperkalemia is stable    Anemia  Anemia is likely due to Iron deficiency. Most recent hemoglobin and hematocrit are listed  below.  Recent Labs     11/10/24  1200   HGB 9.8*   HCT 31.8*     Plan  - Monitor serial CBC: Daily  - Transfuse PRBC if patient becomes hemodynamically unstable, symptomatic or H/H drops below 7/21.  - Patient has not received any PRBC transfusions to date  - Patient's anemia is currently stable    Chronic respiratory failure with hypoxia  Patient with Hypoxic Respiratory failure which is Acute on chronic.  she is on home oxygen at 2 LPM. Supplemental oxygen was provided and noted-  Currently requiring 4L via NC    .   Signs/symptoms of respiratory failure include- tachypnea and increased work of breathing. Contributing diagnoses includes - CHF Labs and images were reviewed. Patient Has not had a recent ABG. Will treat underlying causes and adjust management of respiratory failure as follows- diuresis    CAD (coronary artery disease)  - history noted   - continue statin, plavix    Acute cystitis without hematuria  - patient with reports of dysuria and dark urine noted at bedside  - UA infectious appearing, but with 23 squams  - treat with rocephin for now since patient symptomatic  - follow urine culture       Hyponatremia  Hyponatremia is likely due to Heart Failure. The patient's most recent sodium results are listed below.  Recent Labs     11/08/24  1015 11/10/24  1200   * 128*     Plan  - Correct the sodium by 4-6mEq in 24 hours.   - Obtain the following studies: Urine sodium, urine osmolality, serum osmolality or TSH, T4.  - Will treat the hyponatremia with diuresis  - Monitor sodium Every 8 hours.   - Patient hyponatremia is stable    COPD (chronic obstructive pulmonary disease)  Patient's COPD is controlled currently.  Patient is currently off COPD Pathway. Continue scheduled inhalers Supplemental oxygen and monitor respiratory status closely.   - prn duonebs    Permanent atrial fibrillation  Patient has permanent atrial fibrillation. Patient is currently in sinus rhythm. [id number] Score: 5. The  "patients heart rate in the last 24 hours is as follows:  Pulse  Min: 66  Max: 72     Antiarrhythmics       Anticoagulants  apixaban tablet 5 mg, 2 times daily, Oral    Plan  - Replete lytes with a goal of K>4, Mg >2  - Patient is anticoagulated, see medications listed above.  - Patient's afib is currently controlled        Essential hypertension  Patients blood pressure range in the last 24 hours was: BP  Min: 158/85  Max: 170/74.The patient's inpatient anti-hypertensive regimen is listed below:  Current Antihypertensives  carvediloL tablet 6.25 mg, 2 times daily with meals, Oral  isosorbide mononitrate 24 hr tablet 60 mg, Daily, Oral  ranolazine SR tablet 1,000 mg, 2 times daily, Oral  spironolactone tablet 25 mg, Daily, Oral  furosemide injection 80 mg, 2 times daily, Intravenous    Plan  - BP is uncontrolled, will adjust as follows: can consider increasing carvedilol if HR can tolerate   - states was previously on losartan and recently taken off, can consider restarting losartan or initiating entresto    Gastroesophageal reflux disease  - continue home PPI      HLD (hyperlipidemia)  - continue home statin      Type 2 diabetes mellitus  Patient's FSGs are controlled on current medication regimen.  Last A1c reviewed-   Lab Results   Component Value Date    HGBA1C 4.9 09/25/2024     Most recent fingerstick glucose reviewed- No results for input(s): "POCTGLUCOSE" in the last 24 hours.  Current correctional scale  Low  Maintain anti-hyperglycemic dose as follows-   Antihyperglycemics (From admission, onward)      Start     Stop Route Frequency Ordered    11/10/24 1513  insulin aspart U-100 pen 0-5 Units         -- SubQ Before meals & nightly PRN 11/10/24 1414          Hold Oral hypoglycemics while patient is in the hospital.      VTE Risk Mitigation (From admission, onward)           Ordered     apixaban tablet 5 mg  2 times daily         11/10/24 1414     IP VTE HIGH RISK PATIENT  Once         11/10/24 1414     Place " sequential compression device  Until discontinued         11/10/24 1414     Reason for No Pharmacological VTE Prophylaxis  Once        Question:  Reasons:  Answer:  Already adequately anticoagulated on oral Anticoagulants    11/10/24 1414     Place sequential compression device  Until discontinued         11/10/24 1414     Place sequential compression device  Until discontinued         11/10/24 1414                                    Delilah May PA-C  Department of Hospital Medicine  [facility] - Emergency Dept           Pt is a 18 y/o M w/ PMH of idiopathic scoliosis who presents to the PICU s/p T1-L5 posterior instrumentation w/ spinal fusion today. He has experienced sporadic lower back pain over the past 2 years, which has been worsening. Followed closely by [facility] ortho. On Xray spine 6/12/17 confirmed scoliosis from T11-L4 w/ 72 degrees rightward bending and 57 degrees w/ leftward bending. Cleared by [facility] Cardio pre-operatively. Started on dilaudid PCA and admitted to PICU for post-op management. On arrival to PICU, pt reports feels sleepy and states that he has some lower back pain but it is well-controlled. He describes some difficulty moving his right and left pinky fingers. Of note, unable to obtain further history as caregivers not present at bedside.    PICU Course (6/19- )  Admitted to the PICU s/p T10-L5 posterior spinal fusion. Pain controlled with dilaudid PCA and valium. Diet advanced and urine output monitored. Patient noted to have parasthesia of the left fifth carpal and medial aspect of the hand as well as difficulty with fully extension of the finger. Hand consult initiated, diagnosed ____, recommended _____. Completed 24h postoperative antibiotics. OOB POD#1. Transferred to floor when stable. Pt is a 16 y/o M w/ PMH of idiopathic scoliosis who presents to the PICU s/p T1-L5 posterior instrumentation w/ spinal fusion today. He has experienced sporadic lower back pain over the past 2 years, which has been worsening. Followed closely by [facility]. On Xray spine 6/12/17 confirmed scoliosis from T11-L4 w/ 72 degrees rightward bending and 57 degrees w/ leftward bending. Cleared by [facility] Cardio pre-operatively. Started on dilaudid PCA and admitted to PICU for post-op management. On arrival to PICU, pt reports feels sleepy and states that he has some lower back pain but it is well-controlled. He describes some difficulty moving his right and left pinky fingers. Of note, unable to obtain further history as caregivers not present at bedside.    PICU Course (6/19- )  Admitted to the PICU s/p T10-L5 posterior spinal fusion. Pain controlled with dilaudid PCA and valium, weaned to oxycodone. Diet advanced and urine output monitored. Patient noted to have parasthesia of the left fifth carpal and medial aspect of the hand as well as difficulty with fully extension of the finger. Hand consult initiated, felt symptoms were due to a neuropathy, requested a neuro consult. Neurology felt that symptoms were due to local nerve compression, recommended OT, no need for EMG. Completed 24h postoperative antibiotics. OOB POD#1. Transferred to floor when stable. Pt is a 16 y/o M w/ PMH of idiopathic scoliosis who presents to the PICU s/p T1-L5 posterior instrumentation w/ spinal fusion today. He has experienced sporadic lower back pain over the past 2 years, which has been worsening. Followed closely by [facility]. On Xray spine 6/12/17 confirmed scoliosis from T11-L4 w/ 72 degrees rightward bending and 57 degrees w/ leftward bending. Cleared by [facility] pre-operatively. Started on dilaudid PCA and admitted to PICU for post-op management. On arrival to PICU, pt reports feels sleepy and states that he has some lower back pain but it is well-controlled. He describes some difficulty moving his right and left pinky fingers. Of note, unable to obtain further history as caregivers not present at bedside.    PICU Course (6/19- )  Admitted to the PICU s/p T10-L5 posterior spinal fusion. Pain controlled with dilaudid PCA and valium, weaned to oxycodone. Diet advanced and urine output monitored. Patient noted to have parasthesia of the left fifth carpal and medial aspect of the hand as well as difficulty with fully extension of the finger. Hand consult initiated, felt symptoms were due to a neuropathy, requested a neuro consult. Neurology felt that symptoms were due to local nerve compression, recommended OT, no need for EMG. Completed 24h postoperative antibiotics. OOB POD#1. Transferred to floor when stable.  Pt was seen by OT, nerve palsy improved.  Pt was seen by case management who set up home nursing for his back incision and home PT/OT evaluation.  Standing xrays were obtained before discharge. Pt is stable and ready for discharge home.  He will f.u with Dr. Novoa in 1 month.

## 2025-01-09 NOTE — OCCUPATIONAL THERAPY INITIAL EVALUATION PEDIATRIC - PERTINENT HX OF CURRENT PROBLEM, REHAB EVAL
What Is The Reason For Today's Visit?: Full Body Skin Examination with No Concerns What Is The Reason For Today's Visit? (Being Monitored For X): the development of new lesions 18 y/o male s/p PSF, POD #1. Referred for OT eval due to c/o L 5th digit numbness and weakness d/t ulnar nerve palsy following surgery.